# Patient Record
Sex: FEMALE | Race: BLACK OR AFRICAN AMERICAN | Employment: PART TIME | URBAN - METROPOLITAN AREA
[De-identification: names, ages, dates, MRNs, and addresses within clinical notes are randomized per-mention and may not be internally consistent; named-entity substitution may affect disease eponyms.]

---

## 2017-02-23 PROCEDURE — 93005 ELECTROCARDIOGRAM TRACING: CPT

## 2017-02-23 PROCEDURE — 99283 EMERGENCY DEPT VISIT LOW MDM: CPT

## 2017-02-23 RX ORDER — AMLODIPINE BESYLATE 5 MG/1
5 TABLET ORAL DAILY
COMMUNITY

## 2017-02-23 RX ORDER — LISINOPRIL AND HYDROCHLOROTHIAZIDE 12.5; 2 MG/1; MG/1
2 TABLET ORAL DAILY
COMMUNITY

## 2017-02-24 ENCOUNTER — APPOINTMENT (OUTPATIENT)
Dept: GENERAL RADIOLOGY | Age: 47
End: 2017-02-24
Attending: EMERGENCY MEDICINE
Payer: SELF-PAY

## 2017-02-24 ENCOUNTER — HOSPITAL ENCOUNTER (EMERGENCY)
Age: 47
Discharge: HOME | End: 2017-02-24
Attending: EMERGENCY MEDICINE
Payer: SELF-PAY

## 2017-02-24 VITALS
TEMPERATURE: 98.4 F | DIASTOLIC BLOOD PRESSURE: 70 MMHG | HEART RATE: 103 BPM | SYSTOLIC BLOOD PRESSURE: 102 MMHG | WEIGHT: 210 LBS | HEIGHT: 64 IN | OXYGEN SATURATION: 98 % | RESPIRATION RATE: 18 BRPM | BODY MASS INDEX: 35.85 KG/M2

## 2017-02-24 DIAGNOSIS — J06.9 ACUTE URI: Primary | ICD-10-CM

## 2017-02-24 DIAGNOSIS — R05.9 COUGH: ICD-10-CM

## 2017-02-24 DIAGNOSIS — R42 DIZZINESS: ICD-10-CM

## 2017-02-24 LAB
ATRIAL RATE: 99 BPM
CALCULATED P AXIS, ECG09: 61 DEGREES
CALCULATED R AXIS, ECG10: 12 DEGREES
CALCULATED T AXIS, ECG11: 34 DEGREES
DIAGNOSIS, 93000: NORMAL
P-R INTERVAL, ECG05: 150 MS
Q-T INTERVAL, ECG07: 380 MS
QRS DURATION, ECG06: 72 MS
QTC CALCULATION (BEZET), ECG08: 487 MS
VENTRICULAR RATE, ECG03: 99 BPM

## 2017-02-24 PROCEDURE — 71020 XR CHEST PA LAT: CPT

## 2017-02-24 PROCEDURE — 74011250637 HC RX REV CODE- 250/637: Performed by: EMERGENCY MEDICINE

## 2017-02-24 RX ORDER — OXYMETAZOLINE HCL 0.05 %
2 SPRAY, NON-AEROSOL (ML) NASAL 2 TIMES DAILY
Qty: 1 EACH | Refills: 0 | Status: SHIPPED | OUTPATIENT
Start: 2017-02-24 | End: 2017-02-27

## 2017-02-24 RX ORDER — CETIRIZINE HCL 10 MG
10 TABLET ORAL 2 TIMES DAILY
Qty: 30 TAB | Refills: 0 | Status: SHIPPED | OUTPATIENT
Start: 2017-02-24

## 2017-02-24 RX ORDER — GUAIFENESIN 600 MG/1
600 TABLET, EXTENDED RELEASE ORAL 2 TIMES DAILY
Qty: 20 TAB | Refills: 0 | Status: SHIPPED | OUTPATIENT
Start: 2017-02-24

## 2017-02-24 RX ORDER — IBUPROFEN 600 MG/1
600 TABLET ORAL
Status: COMPLETED | OUTPATIENT
Start: 2017-02-24 | End: 2017-02-24

## 2017-02-24 RX ORDER — LORATADINE 10 MG/1
10 TABLET ORAL
Status: COMPLETED | OUTPATIENT
Start: 2017-02-24 | End: 2017-02-24

## 2017-02-24 RX ADMIN — LORATADINE 10 MG: 10 TABLET ORAL at 01:39

## 2017-02-24 RX ADMIN — IBUPROFEN 600 MG: 600 TABLET ORAL at 01:39

## 2017-02-24 NOTE — ED TRIAGE NOTES
C/o dizziness, light-headed,  productive cough with green sputum, nausea. Denies chest pain/shortness of breath.

## 2017-02-24 NOTE — LETTER
NOTIFICATION RETURN TO WORK / SCHOOL 
 
2/24/2017 1:32 AM 
 
Ms. Naida Ormond [facility], [facility] [phone] To Whom It May Concern: 
 
Naida Ormond is currently under the care of [facility] EMERGENCY DEPT. She will return to work/school on: 2/27/17 If there are questions or concerns please have the patient contact our office. Sincerely, Farzaneh Orozco MD

## 2017-02-24 NOTE — ED PROVIDER NOTES
HPI Comments: Rhonda Mendez is a 40 y.o. Female with recent cough, congestion, prod with sputum. Upper congestion improving, but still has some cough. No fever. Now has felt dizzy, lightheaded. bp lower at home. No nvd, abd pain, edema. No syncope, wheezing. Intermittent cp with cough    The history is provided by the patient. Past Medical History:   Diagnosis Date    [facility] [address]     Hypertension     Seizure ([facility] [address]     childhood       Past Surgical History:   Procedure Laterality Date    HX  SECTION           Family History:   Problem Relation Age of Onset    Hypertension Other     Diabetes Other     Elevated Lipids Other        Social History     Social History    Marital status:      Spouse name: N/A    Number of children: N/A    Years of education: N/A     Occupational History    Not on file. Social History Main Topics    Smoking status: Former Smoker    Smokeless tobacco: Not on file    Alcohol use Yes    Drug use: Yes     Special: Marijuana    Sexual activity: Not on file     Other Topics Concern    Not on file     Social History Narrative         ALLERGIES: Review of patient's allergies indicates no known allergies. Review of Systems   Constitutional: Negative for fever. HENT: Positive for congestion. Negative for sore throat and trouble swallowing. Eyes: Negative for visual disturbance. Respiratory: Positive for cough. Negative for wheezing. Cardiovascular: Negative for leg swelling. Gastrointestinal: Negative for abdominal pain. Endocrine: Negative for polyuria. Genitourinary: Negative for difficulty urinating. Musculoskeletal: Negative for gait problem. Skin: Negative for rash. Neurological: Positive for light-headedness. Negative for syncope. Psychiatric/Behavioral: Positive for sleep disturbance.        Vitals:    17 2258 17 0140   BP: 109/75 102/70   Pulse: (!) 106 (!) 103   Resp: 17 18   Temp: 99.1 °F (37.3 °C) 98.4 °F (36.9 °C)   SpO2: 96% 98%   Weight: 95.3 kg (210 lb)    Height: 5' 4\" (1.626 m)             Physical Exam   Constitutional: She is oriented to person, place, and time. She appears well-developed and well-nourished. No distress. HENT:   Head: Normocephalic and atraumatic. Right Ear: External ear normal.   Left Ear: External ear normal.   Nose: Nose normal.   Mouth/Throat: Uvula is midline, oropharynx is clear and moist and mucous membranes are normal. No oropharyngeal exudate. Eyes: Conjunctivae are normal. No scleral icterus. Neck: Neck supple. Cardiovascular: Normal rate, regular rhythm, normal heart sounds and intact distal pulses. Pulmonary/Chest: Effort normal and breath sounds normal. No respiratory distress. She has no wheezes. She has no rales. Abdominal: Soft. There is no tenderness. Musculoskeletal: She exhibits no edema. Lymphadenopathy:     She has no cervical adenopathy. Neurological: She is alert and oriented to person, place, and time. Gait normal.   Skin: Skin is warm and dry. She is not diaphoretic. Psychiatric: Her behavior is normal.   Nursing note and vitals reviewed.        [facility]  ED Course       Procedures  Vitals:  Patient Vitals for the past 12 hrs:   Temp Pulse Resp BP SpO2   02/24/17 0140 98.4 °F (36.9 °C) (!) 103 18 102/70 98 %   02/23/17 2258 99.1 °F (37.3 °C) (!) 106 17 109/75 96 %         Medications ordered:   Medications   loratadine (CLARITIN) tablet 10 mg (10 mg Oral Given 2/24/17 0139)   ibuprofen (MOTRIN) tablet 600 mg (600 mg Oral Given 2/24/17 0139)         Lab findings:  Recent Results (from the past 12 hour(s))   EKG, 12 LEAD, INITIAL    Collection Time: 02/23/17 11:05 PM   Result Value Ref Range    Ventricular Rate 99 BPM    Atrial Rate 99 BPM    P-R Interval 150 ms    QRS Duration 72 ms    Q-T Interval 380 ms    QTC Calculation (Bezet) 487 ms    Calculated P Axis 61 degrees    Calculated R Axis 12 degrees    Calculated T Axis 34 degrees    Diagnosis Normal sinus rhythm  Possible Left atrial enlargement  Prolonged QT  Abnormal ECG  When compared with ECG of 06-NOV-2014 11:31,  No significant change was found         EKG interpretation by ED Physician:  nsr with no acute st tw abnl. qtc not sig prolonged    X-Ray, CT or other radiology findings or impressions:  XR CHEST PA LAT    (Results Pending)   nap ep itnerp    Progress notes, Consult notes or additional Procedure notes:   Dw/ pt results. Suspect her sx more related to cont upper congestion, cough. Doubt need for labs, other testing    Reevaluation of patient:   Stable for d/c     Disposition:  Diagnosis:   1. Acute URI    2. Cough    3. Dizziness        Disposition: home      Follow-up Information     Follow up With Details Comments Contact Info    [facility] EMERGENCY DEPT  If symptoms worsen [id number]  [phone]            Discharge Medication List as of 2/24/2017  1:35 AM      START taking these medications    Details   cetirizine (ZYRTEC) 10 mg tablet Take 1 Tab by mouth two (2) times a day., Print, Disp-30 Tab, R-0      guaiFENesin ER (MUCINEX) 600 mg ER tablet Take 1 Tab by mouth two (2) times a day., Print, Disp-20 Tab, R-0      oxymetazoline (AFRIN, OXYMETAZOLINE,) 0.05 % nasal spray 2 Sprays by Both Nostrils route two (2) times a day for 3 days. , Print, Disp-1 Each, R-0         CONTINUE these medications which have NOT CHANGED    Details   lisinopril-hydroCHLOROthiazide (PRINZIDE, ZESTORETIC) 20-12.5 mg per tablet Take 2 Tabs by mouth daily. , Historical Med      amLODIPine (NORVASC) 5 mg tablet Take 5 mg by mouth daily. , Historical Med         STOP taking these medications       diphenhydrAMINE (BENADRYL ALLERGY) 25 mg tablet Comments:   Reason for Stopping:

## 2017-02-24 NOTE — ED NOTES
Patient states that she has cold like symptoms for a few weeks. Most of the symptoms have resolved however, patient still complains of a cough. Patient also complains of a headache that has been going on for a few days. Patient states she takes BP medication and that her BP has been running low. Patient states that this has been making her feel dizzy. Purposeful rounding completed:    Side rails up x 1:  YES  Bed low and wheels and locked: YES  Call bell in reach: YES  Comfort addressed: YES    Toileting needs addressed: YES  Plan of care reviewed/updated with patient and or family members: YES  IV site assessed: N/A  Pain assessed and addressed: YES, 5.

## 2017-02-24 NOTE — ED NOTES
Patient updated on plan of care. Purposeful rounding completed:    Side rails up x 1:  YES  Bed low and wheels and locked: YES  Call bell in reach: YES  Comfort addressed: YES    Toileting needs addressed: YES  Plan of care reviewed/updated with patient and or family members: YES  IV site assessed: N/A  Pain assessed and addressed: YES, 0.

## 2021-03-24 ENCOUNTER — APPOINTMENT (OUTPATIENT)
Dept: GENERAL RADIOLOGY | Age: 51
End: 2021-03-24
Attending: EMERGENCY MEDICINE

## 2021-03-24 ENCOUNTER — HOSPITAL ENCOUNTER (EMERGENCY)
Age: 51
Discharge: HOME | End: 2021-03-24
Attending: EMERGENCY MEDICINE

## 2021-03-24 ENCOUNTER — NURSE TRIAGE (OUTPATIENT)
Dept: OTHER | Facility: CLINIC | Age: 51
End: 2021-03-24

## 2021-03-24 VITALS
OXYGEN SATURATION: 100 % | RESPIRATION RATE: 18 BRPM | HEART RATE: 98 BPM | BODY MASS INDEX: 34.83 KG/M2 | SYSTOLIC BLOOD PRESSURE: 122 MMHG | WEIGHT: 204 LBS | TEMPERATURE: 98.4 F | DIASTOLIC BLOOD PRESSURE: 77 MMHG | HEIGHT: 64 IN

## 2021-03-24 DIAGNOSIS — Z20.822 SUSPECTED COVID-19 VIRUS INFECTION: Primary | ICD-10-CM

## 2021-03-24 LAB
ANION GAP SERPL CALC-SCNC: 5 MMOL/L (ref 3–18)
ATRIAL RATE: 92 BPM
BASOPHILS # BLD: 0 K/UL (ref 0–0.1)
BASOPHILS NFR BLD: 0 % (ref 0–2)
BUN SERPL-MCNC: 26 MG/DL (ref 7–18)
BUN/CREAT SERPL: 29 (ref 12–20)
CALCIUM SERPL-MCNC: 9.3 MG/DL (ref 8.5–10.1)
CALCULATED P AXIS, ECG09: 66 DEGREES
CALCULATED R AXIS, ECG10: 14 DEGREES
CALCULATED T AXIS, ECG11: 43 DEGREES
CHLORIDE SERPL-SCNC: 104 MMOL/L (ref 100–111)
CO2 SERPL-SCNC: 30 MMOL/L (ref 21–32)
CREAT SERPL-MCNC: 0.9 MG/DL (ref 0.6–1.3)
DIAGNOSIS, 93000: NORMAL
DIFFERENTIAL METHOD BLD: ABNORMAL
EOSINOPHIL # BLD: 0.1 K/UL (ref 0–0.4)
EOSINOPHIL NFR BLD: 1 % (ref 0–5)
ERYTHROCYTE [DISTWIDTH] IN BLOOD BY AUTOMATED COUNT: 12.4 % (ref 11.6–14.5)
GLUCOSE SERPL-MCNC: 85 MG/DL (ref 74–99)
HCT VFR BLD AUTO: 39.2 % (ref 35–45)
HGB BLD-MCNC: 12.7 G/DL (ref 12–16)
LYMPHOCYTES # BLD: 2.5 K/UL (ref 0.9–3.6)
LYMPHOCYTES NFR BLD: 29 % (ref 21–52)
MCH RBC QN AUTO: 30.8 PG (ref 24–34)
MCHC RBC AUTO-ENTMCNC: 32.4 G/DL (ref 31–37)
MCV RBC AUTO: 94.9 FL (ref 74–97)
MONOCYTES # BLD: 0.9 K/UL (ref 0.05–1.2)
MONOCYTES NFR BLD: 11 % (ref 3–10)
NEUTS SEG # BLD: 4.9 K/UL (ref 1.8–8)
NEUTS SEG NFR BLD: 59 % (ref 40–73)
P-R INTERVAL, ECG05: 172 MS
PLATELET # BLD AUTO: 256 K/UL (ref 135–420)
PMV BLD AUTO: 10.2 FL (ref 9.2–11.8)
POTASSIUM SERPL-SCNC: 4.1 MMOL/L (ref 3.5–5.5)
Q-T INTERVAL, ECG07: 374 MS
QRS DURATION, ECG06: 76 MS
QTC CALCULATION (BEZET), ECG08: 462 MS
RBC # BLD AUTO: 4.13 M/UL (ref 4.2–5.3)
SARS-COV-2, COV2: NORMAL
SODIUM SERPL-SCNC: 139 MMOL/L (ref 136–145)
TROPONIN I SERPL-MCNC: <0.02 NG/ML (ref 0–0.04)
VENTRICULAR RATE, ECG03: 92 BPM
WBC # BLD AUTO: 8.4 K/UL (ref 4.6–13.2)

## 2021-03-24 PROCEDURE — 93005 ELECTROCARDIOGRAM TRACING: CPT

## 2021-03-24 PROCEDURE — 80048 BASIC METABOLIC PNL TOTAL CA: CPT

## 2021-03-24 PROCEDURE — 84484 ASSAY OF TROPONIN QUANT: CPT

## 2021-03-24 PROCEDURE — 71045 X-RAY EXAM CHEST 1 VIEW: CPT

## 2021-03-24 PROCEDURE — 96374 THER/PROPH/DIAG INJ IV PUSH: CPT

## 2021-03-24 PROCEDURE — 85025 COMPLETE CBC W/AUTO DIFF WBC: CPT

## 2021-03-24 PROCEDURE — U0003 INFECTIOUS AGENT DETECTION BY NUCLEIC ACID (DNA OR RNA); SEVERE ACUTE RESPIRATORY SYNDROME CORONAVIRUS 2 (SARS-COV-2) (CORONAVIRUS DISEASE [COVID-19]), AMPLIFIED PROBE TECHNIQUE, MAKING USE OF HIGH THROUGHPUT TECHNOLOGIES AS DESCRIBED BY CMS-2020-01-R: HCPCS

## 2021-03-24 PROCEDURE — 74011250636 HC RX REV CODE- 250/636: Performed by: EMERGENCY MEDICINE

## 2021-03-24 PROCEDURE — 99284 EMERGENCY DEPT VISIT MOD MDM: CPT

## 2021-03-24 RX ORDER — KETOROLAC TROMETHAMINE 30 MG/ML
15 INJECTION, SOLUTION INTRAMUSCULAR; INTRAVENOUS
Status: COMPLETED | OUTPATIENT
Start: 2021-03-24 | End: 2021-03-24

## 2021-03-24 RX ADMIN — KETOROLAC TROMETHAMINE 15 MG: 30 INJECTION, SOLUTION INTRAMUSCULAR at 19:35

## 2021-03-24 RX ADMIN — SODIUM CHLORIDE 1000 ML: 9 INJECTION, SOLUTION INTRAVENOUS at 19:35

## 2021-03-24 NOTE — DISCHARGE INSTRUCTIONS
HOME ISOLATION PRECAUTIONS DURING COVID-19 OUTBREAK (per CDC guidelines)    1) Stay home except to get medical care:  People who are mildly ill with COVID-19 are able to isolate at home during their illness. You should restrict activities outside your home, except for getting medical care. Do not go to work, school, or public areas. Avoid using public transportation, ride-sharing, or taxis. 2) Separate yourself from other people and animals in your home  People: As much as possible, you should stay in a specific room and away from other people in your home. Also, you should use a separate bathroom, if available. 3) Animals: You should restrict contact with pets and other animals while you are sick with COVID-19, just like you would around other people. Although there have not been reports of pets or other animals becoming sick with COVID-19, it is still recommended that people sick with COVID-19 limit contact with animals until more information is known about the virus. When possible, have another member of your household care for your animals while you are sick. If you are sick with COVID-19, avoid contact with your pet, including petting, snuggling, being kissed or licked, and sharing food. If you must care for your pet or be around animals while you are sick, wash your hands before and after you interact with pets and wear a facemask. See COVID-19 and Animals for more information. 4) Call ahead before visiting your doctor  If you have a medical appointment, call the healthcare provider and tell them that you have or may have COVID-19. This will help the healthcare providers office take steps to keep other people from getting infected or exposed. 5) Wear a facemask  You should wear a facemask when you are around other people (e.g., sharing a room or vehicle) or pets and before you enter a healthcare providers office.  If you are not able to wear a facemask (for example, because it causes trouble breathing), then people who live with you should not stay in the same room with you, or they should wear a facemask if they enter your room. 6) Cover your coughs and sneezes  Cover your mouth and nose with a tissue when you cough or sneeze. Throw used tissues in a lined trash can. Immediately wash your hands with soap and water for at least 20 seconds or, if soap and water are not available, clean your hands with an alcohol-based hand  that contains at least 60% alcohol. 7) Clean your hands often  Wash your hands often with soap and water for at least 20 seconds, especially after blowing your nose, coughing, or sneezing; going to the bathroom; and before eating or preparing food. If soap and water are not readily available, use an alcohol-based hand  with at least 60% alcohol, covering all surfaces of your hands and rubbing them together until they feel dry. 8) Soap and water are the best option if hands are visibly dirty. Avoid touching your eyes, nose, and mouth with unwashed hands. 9) Avoid sharing personal household items  You should not share dishes, drinking glasses, cups, eating utensils, towels, or bedding with other people or pets in your home. After using these items, they should be washed thoroughly with soap and water. 10) Clean all high-touch surfaces everyday  High touch surfaces include counters, tabletops, doorknobs, bathroom fixtures, toilets, phones, keyboards, tablets, and bedside tables. Also, clean any surfaces that may have blood, stool, or body fluids on them. Use a household cleaning spray or wipe, according to the label instructions. Labels contain instructions for safe and effective use of the cleaning product including precautions you should take when applying the product, such as wearing gloves and making sure you have good ventilation during use of the product.     11) Monitor your symptoms  Seek prompt medical attention if your illness is worsening (e.g., difficulty breathing). Before seeking care, call your healthcare provider and tell them that you have, or are being evaluated for, COVID-19. Put on a facemask before you enter the facility. These steps will help the healthcare providers office to keep other people in the office or waiting room from getting infected or exposed. Ask your healthcare provider to call the local or state health department. Persons who are placed under active monitoring or facilitated self-monitoring should follow instructions provided by their local health department or occupational health professionals, as appropriate. When working with your local health department check their available hours. 12) If you have a medical emergency and need to call 911, notify the dispatch personnel that you have, or are being evaluated for COVID-19. If possible, put on a facemask before emergency medical services arrive. 13) Discontinuing home isolation  Patients with confirmed COVID-19 should remain under home isolation precautions until the risk of secondary transmission to others is thought to be low. The decision to discontinue home isolation precautions should be made on a case-by-case basis, in consultation with healthcare providers and [facility] and local health departments. Recommended precautions for household members, intimate partners, and caregivers in a nonhealthcare setting of  A patient with symptomatic laboratory-confirmed COVID-19   or   a patient under investigation  Household members, intimate partners, and caregivers in a nonhealthcare setting may have close contact2 with a person with symptomatic, laboratory-confirmed COVID-19 or a person under investigation.  Close contacts should monitor their health; they should call their healthcare provider right away if they develop symptoms suggestive of COVID-19 (e.g., fever, cough, shortness of breath)     Close contacts should also follow these recommendations:  Make sure that you understand and can help the patient follow their healthcare provider's instructions for medication(s) and care. You should help the patient with basic needs in the home and provide support for getting groceries, prescriptions, and other personal needs. Monitor the patient's symptoms. If the patient is getting sicker, call his or her healthcare provider and tell them that the patient has laboratory-confirmed COVID-19. This will help the healthcare provider's office take steps to keep other people in the office or waiting room from getting infected. Ask the healthcare provider to call the local or [facility] health department for additional guidance. If the patient has a medical emergency and you need to call 911, notify the dispatch personnel that the patient has, or is being evaluated for COVID-19. Household members should stay in another room or be  from the patient as much as possible. Household members should use a separate bedroom and bathroom, if available. Prohibit visitors who do not have an essential need to be in the home. Make sure that shared spaces in the home have good air flow, such as by an air conditioner or an opened window, weather permitting. Perform hand hygiene frequently. Wash your hands often with soap and water for at least 20 seconds or use an alcohol-based hand  that contains 60 to 95% alcohol, covering all surfaces of your hands and rubbing them together until they feel dry. Soap and water should be used preferentially if hands are visibly dirty. You and the patient should wear a facemask if you are in the same room. Wear a disposable facemask and gloves when you touch or have contact with the patient's blood, stool, or body fluids, such as saliva, sputum, nasal mucus, vomit, urine. Throw out disposable facemasks and gloves after using them. Do not reuse. When removing personal protective equipment, first remove and dispose of gloves.  Then, immediately clean your hands with soap and water or alcohol-based hand . Next, remove and dispose of facemask, and immediately clean your hands again with soap and water or alcohol-based hand . [address] thoroughly. Immediately remove and wash clothes or bedding that have blood, stool, or body fluids on them. Wear disposable gloves while handling soiled items and keep soiled items away from your body. Clean your hands (with soap and water or an alcohol-based hand ) immediately after removing your gloves. Read and follow directions on labels of laundry or clothing items and detergent. In general, using a normal laundry detergent according to washing machine instructions and dry thoroughly using the warmest temperatures recommended on the clothing label. Discuss any additional questions with your state or local health department or healthcare provider. Footnotes  2Close contact is defined as--  a) being within approximately 6 feet (2 meters) of a COVID-19 case for a prolonged period of time; close contact can occur while caring for, living with, visiting, or sharing a health care waiting area or room with a COVID-19 case  - or -  b) having direct contact with infectious secretions of a COVID-19 case (e.g., being coughed on).

## 2021-03-24 NOTE — ED PROVIDER NOTES
EMERGENCY DEPARTMENT HISTORY AND PHYSICAL EXAM    6:09 PM      Date: 3/24/2021  Patient Name: Cisco Tucker    History of Presenting Illness     Chief Complaint   Patient presents with    Shortness of Breath    Chest Pain    Nausea         History Provided By: Patient    Chief Complaint: chest pain, sob  Duration:  Days  Timing:  Constant  Location: central chest  Quality: Tightness  Severity: Moderate  Modifying Factors: none  Associated Symptoms: denies any other associated signs or symptoms      Additional History (Context): Cisco Tucker is a 53 y.o. female with hypertension who presents with chest pain and shortness of breath. Reports chest pain is centralized anterior tightness. No alleviating or aggravating factors. No radiation. Also complaining of shortness of breath. Again no alleviating or aggravating factors for that. Reports generalized fatigue the last 2 days. No fever. No cough. Did have vomiting yesterday and some diarrhea today. No abdominal pain. Has not taken anything for this. Denies any known sick contacts or known Covid exposures. Social: Denies tobacco, drug use, admits to alcohol 5 times a week    PCP: None    Current Facility-Administered Medications   Medication Dose Route Frequency Provider Last Rate Last Admin    sodium chloride 0.9 % bolus infusion 1,000 mL  1,000 mL IntraVENous ONCE Ekaterina ACOSTA DO 1,000 mL/hr at 03/24/21 1935 1,000 mL at 03/24/21 1935     Current Outpatient Medications   Medication Sig Dispense Refill    lisinopril-hydroCHLOROthiazide (PRINZIDE, ZESTORETIC) 20-12.5 mg per tablet Take 2 Tabs by mouth daily.  amLODIPine (NORVASC) 5 mg tablet Take 5 mg by mouth daily.  cetirizine (ZYRTEC) 10 mg tablet Take 1 Tab by mouth two (2) times a day. 30 Tab 0    guaiFENesin ER (MUCINEX) 600 mg ER tablet Take 1 Tab by mouth two (2) times a day.  20 Tab 0       Past History     Past Medical History:  Past Medical History:   Diagnosis Date    [facility] [address] [facility]     Hypertension     Seizure ([facility] [address] [facility]     childhood       Past Surgical History:  Past Surgical History:   Procedure Laterality Date    HX  SECTION         Family History:  Family History   Problem Relation Age of Onset    Hypertension Other     Diabetes Other     Elevated Lipids Other        Social History:  Social History     Tobacco Use    Smoking status: Former Smoker   Substance Use Topics    Alcohol use: Yes    Drug use: Yes     Types: Marijuana       Allergies:  No Known Allergies      Review of Systems       Review of Systems   Constitutional: Positive for fatigue. Negative for chills and fever. Respiratory: Positive for shortness of breath. Negative for cough and chest tightness. Cardiovascular: Positive for chest pain. Gastrointestinal: Positive for diarrhea and vomiting. Negative for abdominal pain. All other systems reviewed and are negative. Physical Exam     Visit Vitals  /77   Pulse 98   Temp 98.4 °F (36.9 °C)   Resp 18   Ht 5' 4\" (1.626 m)   Wt 92.5 kg (204 lb)   SpO2 100%   BMI 35.02 kg/m²         Physical Exam  Constitutional:       Appearance: She is well-developed. HENT:      Head: Normocephalic and atraumatic. Neck:      Musculoskeletal: Neck supple. Vascular: No JVD. Cardiovascular:      Rate and Rhythm: Normal rate and regular rhythm. Pulmonary:      Effort: Pulmonary effort is normal. No respiratory distress. Breath sounds: Normal breath sounds. No wheezing. Abdominal:      General: There is no distension. Palpations: Abdomen is soft. Tenderness: There is no abdominal tenderness. There is no guarding or rebound. Musculoskeletal:      Comments: No joint tenderness   Skin:     General: Skin is warm and dry. Findings: No erythema. Neurological:      Mental Status: She is alert and oriented to person, place, and time.    Psychiatric:         Judgment: Judgment normal.           Diagnostic Study Results     Labs -  Recent Results (from the past 12 hour(s))   SARS-COV-2    Collection Time: 03/24/21  7:00 PM   Result Value Ref Range    SARS-CoV-2 Please find results under separate order     CBC WITH AUTOMATED DIFF    Collection Time: 03/24/21  7:15 PM   Result Value Ref Range    WBC 8.4 4.6 - 13.2 K/uL    RBC 4.13 (L) 4.20 - 5.30 M/uL    HGB 12.7 12.0 - 16.0 g/dL    HCT 39.2 35.0 - 45.0 %    MCV 94.9 74.0 - 97.0 FL    MCH 30.8 24.0 - 34.0 PG    MCHC 32.4 31.0 - 37.0 g/dL    RDW 12.4 11.6 - 14.5 %    PLATELET 221 713 - 716 K/uL    MPV 10.2 9.2 - 11.8 FL    NEUTROPHILS 59 40 - 73 %    LYMPHOCYTES 29 21 - 52 %    MONOCYTES 11 (H) 3 - 10 %    EOSINOPHILS 1 0 - 5 %    BASOPHILS 0 0 - 2 %    ABS. NEUTROPHILS 4.9 1.8 - 8.0 K/UL    ABS. LYMPHOCYTES 2.5 0.9 - 3.6 K/UL    ABS. MONOCYTES 0.9 0.05 - 1.2 K/UL    ABS. EOSINOPHILS 0.1 0.0 - 0.4 K/UL    ABS.  BASOPHILS 0.0 0.0 - 0.1 K/UL    DF AUTOMATED     METABOLIC PANEL, BASIC    Collection Time: 03/24/21  7:15 PM   Result Value Ref Range    Sodium 139 136 - 145 mmol/L    Potassium 4.1 3.5 - 5.5 mmol/L    Chloride 104 100 - 111 mmol/L    CO2 30 21 - 32 mmol/L    Anion gap 5 3.0 - 18 mmol/L    Glucose 85 74 - 99 mg/dL    BUN 26 (H) 7.0 - 18 MG/DL    Creatinine 0.90 0.6 - 1.3 MG/DL    BUN/Creatinine ratio 29 (H) 12 - 20      GFR est AA >60 >60 ml/min/1.73m2    GFR est non-AA >60 >60 ml/min/1.73m2    Calcium 9.3 8.5 - 10.1 MG/DL   TROPONIN I    Collection Time: 03/24/21  7:15 PM   Result Value Ref Range    Troponin-I, QT <0.02 0.0 - 0.045 NG/ML   EKG, 12 LEAD, INITIAL    Collection Time: 03/24/21  7:28 PM   Result Value Ref Range    Ventricular Rate 92 BPM    Atrial Rate 92 BPM    P-R Interval 172 ms    QRS Duration 76 ms    Q-T Interval 374 ms    QTC Calculation (Bezet) 462 ms    Calculated P Axis 66 degrees    Calculated R Axis 14 degrees    Calculated T Axis 43 degrees    Diagnosis       Normal sinus rhythm  Normal ECG  When compared with ECG of 23-FEB-2017 23:05,  No significant change was found         Radiologic Studies -   XR CHEST PORT    (Results Pending)      No acute process per me    Medical Decision Making   I am the first provider for this patient. I reviewed the vital signs, available nursing notes, past medical history, past surgical history, family history and social history. Vital Signs-Reviewed the patient's vital signs. Pulse Oximetry Analysis -  100 on room air (Interpretation)nl       EKG: Interpreted by the EP. Time Interpreted: 1932   Rate: 92   Rhythm: Normal Sinus Rhythm    Interpretation: Normal axis, normal intervals, no ST changes   Comparison: 2/23/2017, no significant change    Records Reviewed: Nursing Notes (Time of Review: 6:09 PM)    ED Course: Progress Notes, Reevaluation, and Consults:      Provider Notes (Medical Decision Making): 52-year-old female presenting with shortness of breath and chest pain. Also generalized fatigue. Rule out pneumonia with chest x-ray. Rule out ACS with EKG and troponin. Single should be sufficient due to duration of symptoms. No PE risk factors, unable to [facility] due to heart rate but no signs of DVT on exam.  Pain does not sound consistent with PE. Discussed results with patient. Labs reassuring. Stable for discharge home. Discussed self quarantine pending Covid results. Discussed supportive care. Diagnosis     Clinical Impression:   1.  Suspected COVID-19 virus infection        Disposition: discharged    Follow-up Information     Follow up With Specialties Details Why [address] [facility]  Schedule an appointment as soon as possible for a visit   [address] [facility]  As needed, If symptoms worsen, however please note that after 3/31/2021 the emergency department will be closed [id number]  [phone]           Patient's Medications   Start Taking    No medications on file   Continue Taking AMLODIPINE (NORVASC) 5 MG TABLET    Take 5 mg by mouth daily. CETIRIZINE (ZYRTEC) 10 MG TABLET    Take 1 Tab by mouth two (2) times a day. GUAIFENESIN ER (MUCINEX) 600 MG ER TABLET    Take 1 Tab by mouth two (2) times a day. LISINOPRIL-HYDROCHLOROTHIAZIDE (PRINZIDE, ZESTORETIC) 20-12.5 MG PER TABLET    Take 2 Tabs by mouth daily.    These Medications have changed    No medications on file   Stop Taking    No medications on file     _______________________________

## 2021-03-24 NOTE — TELEPHONE ENCOUNTER
Reason for Disposition   Chest pain or pressure    Answer Assessment - Initial Assessment Questions  1. COVID-19 DIAGNOSIS: \"Who made your Coronavirus (COVID-19) diagnosis? \" \"Was it confirmed by a positive lab test?\" If not diagnosed by a HCP, ask \"Are there lots of cases (community spread) where you live? \" (See public health department website, if unsure)      N/a    2. COVID-19 EXPOSURE: \"Was there any known exposure to COVID before the symptoms began? \" CDC Definition of close contact: within 6 feet (2 meters) for a total of 15 minutes or more over a 24-hour period. Unknown    3. ONSET: \"When did the COVID-19 symptoms start? \"       Onset was 03/23/21    4. WORST SYMPTOM: \"What is your worst symptom? \" (e.g., cough, fever, shortness of breath, muscle aches)      Chest hurts    5. COUGH: \"Do you have a cough? \" If so, ask: \"How bad is the cough? \"        No cough    6. FEVER: \"Do you have a fever? \" If so, ask: \"What is your temperature, how was it measured, and when did it start? \"      No fever    7. RESPIRATORY STATUS: \"Describe your breathing? \" (e.g., shortness of breath, wheezing, unable to speak)       Short of breath earlier today and once yesterday - now resolved    8. BETTER-SAME-WORSE: Elton Gauze you getting better, staying the same or getting worse compared to yesterday? \"  If getting worse, ask, \"In what way? \"      Same  a  9. HIGH RISK DISEASE: \"Do you have any chronic medical problems? \" (e.g., asthma, heart or lung disease, weak immune system, etc.)      Hypertension (well controlled on meds)    10. PREGNANCY: \"Is there any chance you are pregnant? \" \"When was your last menstrual period? \"        No    11. OTHER SYMPTOMS: \"Do you have any other symptoms? \"  (e.g., chills, fatigue, headache, loss of smell or taste, muscle pain, sore throat)        Chest hurts (with deep breaths - worsens), nausea, lightheaded, feels weakness, fatigue,    Protocols used: CORONAVIRUS (COVID-19) DIAGNOSED OR SUSPECTED-ADULT-[place]    See triage above:  Recommended patient go in to the ED. Provided care advice. Encouraged her to take a list of medications and to refrain from eating or drinking until seen by a provider.

## 2021-03-24 NOTE — LETTER
[facility] EMERGENCY DEPT 
[address] 
[address] [facility] [address] [phone] Work/School Note Date: 3/24/2021 To Whom It May concern: 
 
Li Martinez was evaulated by the following provider(s): 
Attending Provider: Shama Moe virus is suspected. Per the CDC guidelines we recommend home isolation until the following conditions are all met: 1. At least 10 days have passed since symptoms first appeared and 2. At least 24 hours have passed since last fever without the use of fever-reducing medications and 
3. Symptoms (e.g., cough, shortness of breath) have improved Sincerely, Patricia Pena, DO

## 2021-03-25 ENCOUNTER — PATIENT OUTREACH (OUTPATIENT)
Dept: CASE MANAGEMENT | Age: 51
End: 2021-03-25

## 2021-03-25 NOTE — ED NOTES
Pt discharged home via POV. Pt walked to triage with steady gait, no assistance.   Pt given DC instructions and had no further questions

## 2021-03-25 NOTE — PROGRESS NOTES
Date/Time:  3/25/2021 8:48 AM   Call within 2 business days of discharge: Yes   Attempted to reach patient by telephone. Left HIPPA compliant message requesting a return call. Will attempt to reach patient again. Covid test pending.

## 2021-03-26 LAB — SARS-COV-2, COV2NT: NOT DETECTED

## 2021-03-29 ENCOUNTER — PATIENT OUTREACH (OUTPATIENT)
Dept: CASE MANAGEMENT | Age: 51
End: 2021-03-29

## 2021-03-29 NOTE — PROGRESS NOTES
Date/Time:  3/29/2021 9:32 AM   Call within 2 business days of discharge: Yes   2nd attempt to reach patient by telephone. Left HIPPA compliant message requesting a return call. This episode is resolved. Covid test negative.